# Patient Record
Sex: MALE | Race: WHITE
[De-identification: names, ages, dates, MRNs, and addresses within clinical notes are randomized per-mention and may not be internally consistent; named-entity substitution may affect disease eponyms.]

---

## 2021-08-17 ENCOUNTER — APPOINTMENT (OUTPATIENT)
Dept: PULMONOLOGY | Facility: CLINIC | Age: 36
End: 2021-08-17
Payer: COMMERCIAL

## 2021-08-17 VITALS
SYSTOLIC BLOOD PRESSURE: 118 MMHG | RESPIRATION RATE: 15 BRPM | WEIGHT: 205 LBS | HEART RATE: 62 BPM | OXYGEN SATURATION: 99 % | BODY MASS INDEX: 30.36 KG/M2 | DIASTOLIC BLOOD PRESSURE: 80 MMHG | HEIGHT: 69 IN

## 2021-08-17 DIAGNOSIS — Z82.49 FAMILY HISTORY OF ISCHEMIC HEART DISEASE AND OTHER DISEASES OF THE CIRCULATORY SYSTEM: ICD-10-CM

## 2021-08-17 DIAGNOSIS — F17.200 NICOTINE DEPENDENCE, UNSPECIFIED, UNCOMPLICATED: ICD-10-CM

## 2021-08-17 DIAGNOSIS — R07.9 CHEST PAIN, UNSPECIFIED: ICD-10-CM

## 2021-08-17 DIAGNOSIS — J45.909 UNSPECIFIED ASTHMA, UNCOMPLICATED: ICD-10-CM

## 2021-08-17 DIAGNOSIS — Z82.5 FAMILY HISTORY OF ASTHMA AND OTHER CHRONIC LOWER RESPIRATORY DISEASES: ICD-10-CM

## 2021-08-17 PROBLEM — Z00.00 ENCOUNTER FOR PREVENTIVE HEALTH EXAMINATION: Status: ACTIVE | Noted: 2021-08-17

## 2021-08-17 PROCEDURE — 99205 OFFICE O/P NEW HI 60 MIN: CPT

## 2021-08-17 NOTE — DISCUSSION/SUMMARY
[FreeTextEntry1] : 36-year-old male, seen today for the above. Patient complains most likely musculoskeletal in nature, possibly complicated by some degree of anxiety. No evidence of intrinsic lung disease seen on chest x-ray. No risk factors for pulmonary emboli. Patient will still undergo a d-dimer and complete his cardiac workup.

## 2021-08-17 NOTE — HISTORY OF PRESENT ILLNESS
[TextBox_4] : 36-year-old male, social smoker, regular exercise with weight lifting, seen today for evaluation of chest pain. Patient has a strong family history of coronary artery disease. 3 weeks ago, developed atypical left-sided chest pain, which could not be elicited with working out. This is associated with some lightheadedness and fatigue. He underwent a cardiac workup, which included an EKG and echocardiogram, which was normal. The symptoms continue to wax and wane. Eventually forcing him to seek attention at [facility] emergency room. He underwent a chest x-ray, which was reviewed on PACS. The report described mild right infrahilar haziness but on my review. This was unremarkable. Laboratory evaluation was also negative. The patient's pain has significantly decreased nearly 50% and remains intermittent. Has no nocturnal awakenings or shortness of breath associated. He is pending completion of his cardiac workup

## 2021-08-17 NOTE — END OF VISIT
[FreeTextEntry3] : [facility] chart and pacs reviewed [Time Spent: ___ minutes] : I have spent [unfilled] minutes of time on the encounter.

## 2021-08-19 LAB — DEPRECATED D DIMER PPP IA-ACNC: 335 NG/ML DDU

## 2021-10-05 ENCOUNTER — APPOINTMENT (OUTPATIENT)
Dept: PULMONOLOGY | Facility: CLINIC | Age: 36
End: 2021-10-05